# Patient Record
Sex: MALE | Race: WHITE | URBAN - METROPOLITAN AREA
[De-identification: names, ages, dates, MRNs, and addresses within clinical notes are randomized per-mention and may not be internally consistent; named-entity substitution may affect disease eponyms.]

---

## 2018-08-09 ENCOUNTER — OFFICE VISIT (OUTPATIENT)
Dept: URBAN - METROPOLITAN AREA CLINIC 76 | Facility: CLINIC | Age: 58
End: 2018-08-09
Payer: COMMERCIAL

## 2018-08-09 PROCEDURE — 92014 COMPRE OPH EXAM EST PT 1/>: CPT | Performed by: OPTOMETRIST

## 2018-08-09 ASSESSMENT — INTRAOCULAR PRESSURE
OD: 21
OS: 20

## 2018-08-09 NOTE — IMPRESSION/PLAN
Impression: Diagnosis: Type 2 diabetes mellitus without complications. Code: E11.9. Plan: DM: No diabetic retinopathy. Discussed ocular and systemic benefits of blood sugar control. Patient was instructed to monitor vision for sudden changes and to call if visual changes noted.

## 2018-08-09 NOTE — IMPRESSION/PLAN
Impression: Combined forms of age-related cataract, bilateral: H25.813. Plan: Cataracts affecting vision some, but no surgery is currently recommended. The patient will monitor vision changes and contact us with any decrease in vision. Continue to monitor.

## 2018-08-09 NOTE — IMPRESSION/PLAN
Impression: Diagnosis: Open angle with borderline findings, low risk, bilateral. Code: H40.013. Tmax 26. Stays low 20s. thick pachs. Last OCT/VF 2012. Plan: Discussed. Advise updated glc testing: VF/OCT with review.

## 2018-09-12 ENCOUNTER — OFFICE VISIT (OUTPATIENT)
Dept: URBAN - METROPOLITAN AREA CLINIC 76 | Facility: CLINIC | Age: 58
End: 2018-09-12
Payer: COMMERCIAL

## 2018-09-12 PROCEDURE — 92012 INTRM OPH EXAM EST PATIENT: CPT | Performed by: OPTOMETRIST

## 2018-09-12 PROCEDURE — 92083 EXTENDED VISUAL FIELD XM: CPT | Performed by: OPTOMETRIST

## 2018-09-12 PROCEDURE — 92133 CPTRZD OPH DX IMG PST SGM ON: CPT | Performed by: OPTOMETRIST

## 2018-09-12 RX ORDER — LATANOPROST 50 UG/ML
0.005 % SOLUTION OPHTHALMIC
Qty: 2.5 | Refills: 5 | Status: INACTIVE
Start: 2018-09-12 | End: 2019-05-20

## 2018-09-12 ASSESSMENT — INTRAOCULAR PRESSURE
OD: 19
OS: 18

## 2018-09-12 NOTE — IMPRESSION/PLAN
Impression: Primary open-angle glaucoma, bilateral, mild stage: H40.1131. VF 9/12/18 OU: nasal defects, worse compared to 2012. OCT 9/12/18: WNL but worse OU. Thicks pachs. Plan: Discussed condition and risk of permanent sight loss without treatment. Recommend start Latanoprost QHS OU.

## 2018-10-18 ENCOUNTER — OFFICE VISIT (OUTPATIENT)
Dept: URBAN - METROPOLITAN AREA CLINIC 76 | Facility: CLINIC | Age: 58
End: 2018-10-18
Payer: COMMERCIAL

## 2018-10-18 PROCEDURE — 99213 OFFICE O/P EST LOW 20 MIN: CPT | Performed by: OPTOMETRIST

## 2018-10-18 ASSESSMENT — INTRAOCULAR PRESSURE
OS: 12
OD: 13

## 2018-10-18 NOTE — IMPRESSION/PLAN
Impression: Primary open-angle glaucoma, bilateral, mild stage: H40.1131. VF 9/12/18 OU: nasal defects, worse compared to 2012. OCT 9/12/18: WNL but worse OU. Thicks pachs. IOP improved on latan. Mild itch for few seconds until lightly rubs. Plan: Discussed condition and risk of permanent sight loss without treatment. Continue Latanoprost QHS OU. Call if itching worsens.

## 2019-05-29 ENCOUNTER — OFFICE VISIT (OUTPATIENT)
Dept: URBAN - METROPOLITAN AREA CLINIC 76 | Facility: CLINIC | Age: 59
End: 2019-05-29
Payer: MEDICARE

## 2019-05-29 PROCEDURE — 92012 INTRM OPH EXAM EST PATIENT: CPT | Performed by: OPTOMETRIST

## 2019-05-29 RX ORDER — TIMOLOL MALEATE 5 MG/ML
0.5 % SOLUTION/ DROPS OPHTHALMIC
Qty: 5 | Refills: 1 | Status: INACTIVE
Start: 2019-05-29 | End: 2019-08-06

## 2019-05-29 ASSESSMENT — INTRAOCULAR PRESSURE
OD: 20
OS: 18

## 2019-05-29 NOTE — IMPRESSION/PLAN
Impression: Primary open-angle glaucoma, bilateral, mild stage: H40.1131. VF 9/12/18 OU: nasal defects, worse compared to 2012. OCT 9/12/18: WNL but worse OU. Thicks pachs. IOP UNCONTROLLED now on latan. Ineffective or IOP got worse? Pt didn't return as advised due to health issues. Plan: Discussed condition and risk of permanent sight loss without treatment. STOP Latanoprost QHS OU. Begin timolol BID OU. Pt says no heart or lung problems.

## 2019-07-11 ENCOUNTER — OFFICE VISIT (OUTPATIENT)
Dept: URBAN - METROPOLITAN AREA CLINIC 76 | Facility: CLINIC | Age: 59
End: 2019-07-11
Payer: MEDICARE

## 2019-07-11 PROCEDURE — 92012 INTRM OPH EXAM EST PATIENT: CPT | Performed by: OPTOMETRIST

## 2019-07-11 ASSESSMENT — INTRAOCULAR PRESSURE
OD: 18
OS: 12
OS: 14
OD: 17

## 2019-07-11 NOTE — IMPRESSION/PLAN
Impression: Primary open-angle glaucoma, bilateral, mild stage: H40.1131. VF 9/12/18 OU: nasal defects, worse compared to 2012. OCT 9/12/18: WNL but worse OU. Thicks pachs. IOP UNCONTROLLED OD timolol OU. IOP improved OS, still too high for ON appearance OD Plan: Discussed condition and risk of permanent sight loss without treatment. Stop Timolol BID OU, Begin Alphagan-P BID OU. Due to complexity of getting pressure to drop, refer to Dr. Bibi Small to review other non-medicated options.

## 2019-08-06 ENCOUNTER — OFFICE VISIT (OUTPATIENT)
Dept: URBAN - METROPOLITAN AREA CLINIC 76 | Facility: CLINIC | Age: 59
End: 2019-08-06
Payer: MEDICARE

## 2019-08-06 DIAGNOSIS — H40.1131 PRIMARY OPEN-ANGLE GLAUCOMA, BILATERAL, MILD STAGE: Primary | ICD-10-CM

## 2019-08-06 PROCEDURE — 92004 COMPRE OPH EXAM NEW PT 1/>: CPT | Performed by: OPHTHALMOLOGY

## 2019-08-06 PROCEDURE — 92014 COMPRE OPH EXAM EST PT 1/>: CPT | Performed by: OPHTHALMOLOGY

## 2019-08-06 RX ORDER — BRIMONIDINE TARTRATE 1 MG/ML
0.1 % SOLUTION/ DROPS OPHTHALMIC
Qty: 15 | Refills: 3 | Status: INACTIVE
Start: 2019-08-06 | End: 2019-08-07

## 2019-08-06 ASSESSMENT — INTRAOCULAR PRESSURE
OD: 14
OS: 15

## 2019-08-06 NOTE — IMPRESSION/PLAN
Impression: Primary open-angle glaucoma, bilateral, mild stage: H40.1131. OU. vs glaucoma suspect. IOP OU ok today
testing reviewed. VF showing fluctuations. Plan: Continue to monitor. D/C Alphagan will switch to Brimonidine BID OU.

## 2019-08-07 RX ORDER — BRIMONIDINE TARTRATE 2 MG/ML
0.2 % SOLUTION/ DROPS OPHTHALMIC
Qty: 15 | Refills: 3 | Status: INACTIVE
Start: 2019-08-07 | End: 2020-02-21

## 2019-08-28 ENCOUNTER — OFFICE VISIT (OUTPATIENT)
Dept: URBAN - METROPOLITAN AREA CLINIC 76 | Facility: CLINIC | Age: 59
End: 2019-08-28
Payer: MEDICARE

## 2019-08-28 PROCEDURE — 92014 COMPRE OPH EXAM EST PT 1/>: CPT | Performed by: OPTOMETRIST

## 2019-08-28 ASSESSMENT — INTRAOCULAR PRESSURE
OD: 15
OS: 16

## 2019-08-28 NOTE — IMPRESSION/PLAN
Impression: Type 2 diabetes mellitus w/o complication: L29.0. Plan: DM: No diabetic retinopathy. Discussed ocular and systemic benefits of blood sugar control. Patient was instructed to monitor vision for changes and to call if noted.

## 2019-08-28 NOTE — IMPRESSION/PLAN
Impression: Primary open-angle glaucoma, bilateral, mild stage: H40.1131. OU. vs glaucoma suspect. IOP OU ok today
testing reviewed. VF showing fluctuations. Plan: Continue to monitor. Continue Brimonidine BID OU.

## 2019-12-04 ENCOUNTER — OFFICE VISIT (OUTPATIENT)
Dept: URBAN - METROPOLITAN AREA CLINIC 76 | Facility: CLINIC | Age: 59
End: 2019-12-04
Payer: MEDICARE

## 2019-12-04 PROCEDURE — 99213 OFFICE O/P EST LOW 20 MIN: CPT | Performed by: OPTOMETRIST

## 2019-12-04 ASSESSMENT — INTRAOCULAR PRESSURE
OS: 22
OD: 18

## 2019-12-04 NOTE — IMPRESSION/PLAN
Impression: Primary open-angle glaucoma, bilateral, mild stage: H40.1131. VF 9/12/18 OU: nasal defects, worse compared to 2012. OCT 9/12/18: WNL but worse OU. Thicks pachs. IOP higher OU today. Plan: Discussed condition. Advised compliance. Continue Brimonidine BID OU. Consider additional treatment. Needs updated tests.

## 2020-01-01 ENCOUNTER — OFFICE VISIT (OUTPATIENT)
Dept: URBAN - METROPOLITAN AREA CLINIC 76 | Facility: CLINIC | Age: 60
End: 2020-01-01
Payer: MEDICARE

## 2020-01-01 PROCEDURE — 99213 OFFICE O/P EST LOW 20 MIN: CPT | Performed by: OPTOMETRIST

## 2020-01-01 ASSESSMENT — INTRAOCULAR PRESSURE
OD: 12
OD: 18
OS: 12
OS: 18

## 2020-01-28 ENCOUNTER — OFFICE VISIT (OUTPATIENT)
Dept: URBAN - METROPOLITAN AREA CLINIC 76 | Facility: CLINIC | Age: 60
End: 2020-01-28
Payer: MEDICARE

## 2020-01-28 DIAGNOSIS — H40.013 OPEN ANGLE WITH BORDERLINE FINDINGS, LOW RISK, BILATERAL: ICD-10-CM

## 2020-01-28 PROCEDURE — 92012 INTRM OPH EXAM EST PATIENT: CPT | Performed by: OPTOMETRIST

## 2020-01-28 PROCEDURE — 92083 EXTENDED VISUAL FIELD XM: CPT | Performed by: OPTOMETRIST

## 2020-01-28 PROCEDURE — 92133 CPTRZD OPH DX IMG PST SGM ON: CPT | Performed by: OPTOMETRIST

## 2020-01-28 RX ORDER — DORZOLAMIDE HCL 20 MG/ML
2 % SOLUTION/ DROPS OPHTHALMIC
Qty: 5 | Refills: 3 | Status: INACTIVE
Start: 2020-01-28 | End: 2020-01-01

## 2020-01-28 ASSESSMENT — INTRAOCULAR PRESSURE
OD: 20
OS: 20

## 2020-01-28 NOTE — IMPRESSION/PLAN
Impression: Primary open-angle glaucoma, bilateral, mild stage: H40.1131. VF 01/28/2020 OU: superior arc worse, general reduction OS. OCT 01/28/2020: worse OU. Thicks pachs. IOP higher OU today. Plan: Discussed condition. Advised compliance. Continue Brimonidine BID OU. recommend starting additional treatment.  Will start Dorzolamide BID OU. space drops out at least 5 mins apart

## 2020-01-28 NOTE — IMPRESSION/PLAN
Impression: Type 2 diabetes mellitus w/o complication: Q06.4. Plan: DM: No diabetic retinopathy. Discussed ocular and systemic benefits of blood sugar control. Patient was instructed to monitor vision for changes and to call if noted.

## 2020-02-25 ENCOUNTER — OFFICE VISIT (OUTPATIENT)
Dept: URBAN - METROPOLITAN AREA CLINIC 76 | Facility: CLINIC | Age: 60
End: 2020-02-25
Payer: MEDICARE

## 2020-02-25 PROCEDURE — 99213 OFFICE O/P EST LOW 20 MIN: CPT | Performed by: OPTOMETRIST

## 2020-02-25 ASSESSMENT — INTRAOCULAR PRESSURE
OD: 18
OS: 19

## 2020-02-25 NOTE — IMPRESSION/PLAN
Impression: Primary open-angle glaucoma, bilateral, mild stage: H40.1131. VF 01/28/2020 OU: superior arc worse, general reduction OS. OCT 01/28/2020: worse OU. Thick pachs. IOP not controlled OU. Target: mid-upper teens. latanoprost, timolol, dorzolamide ineffective. Brimonidine seemed effective for approx 5 mo (July to Dec). MMT. Plan: Discussed condition. Advised compliance. Continue Brimonidine BID OU. Stop Dorzolamide BID OU. Refer to Dr. Brian Moyer to consider addtl/alternative treatment options.

## 2020-04-16 ENCOUNTER — OFFICE VISIT (OUTPATIENT)
Dept: URBAN - METROPOLITAN AREA CLINIC 76 | Facility: CLINIC | Age: 60
End: 2020-04-16
Payer: MEDICARE

## 2020-04-16 DIAGNOSIS — E11.9 TYPE 2 DIABETES MELLITUS W/O COMPLICATION: ICD-10-CM

## 2020-04-16 DIAGNOSIS — H25.13 AGE-RELATED NUCLEAR CATARACT, BILATERAL: ICD-10-CM

## 2020-04-16 PROCEDURE — 92020 GONIOSCOPY: CPT | Performed by: OPHTHALMOLOGY

## 2020-04-16 PROCEDURE — 92014 COMPRE OPH EXAM EST PT 1/>: CPT | Performed by: OPHTHALMOLOGY

## 2020-04-16 ASSESSMENT — INTRAOCULAR PRESSURE
OD: 19
OS: 18

## 2020-04-16 NOTE — IMPRESSION/PLAN
Impression: Primary open-angle glaucoma, bilateral, mild stage: H40.1131. IOP OU good today. VF and OCT reviewed Target: Mid -High teens Plan: Discussed condition. Continue to monitor. Continue Brimonidine BID OU. No additional treatment recommend at this time. Will transfer care back to Dr Shelvy Cooks

## 2020-04-16 NOTE — IMPRESSION/PLAN
Impression: Type 2 diabetes mellitus w/o complication: E31.7. Plan: DM: No diabetic retinopathy. Discussed ocular and systemic benefits of blood sugar control. Patient was instructed to monitor vision for changes and to call if noted.

## 2020-07-22 NOTE — IMPRESSION/PLAN
Impression: Primary open-angle glaucoma, bilateral, mild stage: H40.1131. IOP OU good today. VF 01/28/2020 OU: superior arc worse, general reduction OS. OCT 01/28/2020: worse OU. Thick pachs. Target: mid-upper teens. latanoprost, timolol, dorzolamide ineffective. Brimonidine seemed effective for approx 5 mo (July to Dec). MMT. Plan: Discussed condition. Advised compliance. Continue Brimonidine BID OU. 
No additional treatment per Dr Kurt Rivera

## 2020-07-22 NOTE — IMPRESSION/PLAN
Impression: Type 2 diabetes mellitus w/o complication: C39.8. Plan: DM: No diabetic retinopathy. Discussed ocular and systemic benefits of blood sugar control. Patient was instructed to monitor vision for changes and to call if noted.

## 2020-10-28 NOTE — IMPRESSION/PLAN
Impression: Presbyopia: H52.4. Bilateral. Last mrx was 2017. Plan: Ok to schedule refraction if/when pt would like. Advised cannot be done day of annual DE/VF/OCT due OAG and DM.   Needs to be separate exam.

## 2020-10-28 NOTE — IMPRESSION/PLAN
Impression: Primary open-angle glaucoma, bilateral, mild stage: H40.1131. IOP higher OU today. VF 01/28/2020 OU: superior arc worse, general reduction OS. OCT 01/28/2020: worse OU. Thick pachs. Target: mid-upper teens. Latanoprost, Timolol, Dorzolamide ineffective. Brimonidine seemed effective for approx 5 mo (July to Dec). MMT. Plan: Discussed condition. Advised compliance. Continue Brimonidine BID OU. No additional treatment per Dr Eduardo Llanos. Repeat VF/OCT 1/2021.

## 2020-10-28 NOTE — IMPRESSION/PLAN
Impression: Age-related nuclear cataract, bilateral: H25.13. OU. Plan: Discussed condition. Continue to monitor.

## 2021-01-01 ENCOUNTER — POST-OPERATIVE VISIT (OUTPATIENT)
Dept: URBAN - METROPOLITAN AREA CLINIC 76 | Facility: CLINIC | Age: 61
End: 2021-01-01
Payer: MEDICARE

## 2021-01-01 ENCOUNTER — TESTING ONLY (OUTPATIENT)
Dept: URBAN - METROPOLITAN AREA CLINIC 76 | Facility: CLINIC | Age: 61
End: 2021-01-01

## 2021-01-01 ENCOUNTER — SURGERY (OUTPATIENT)
Dept: URBAN - METROPOLITAN AREA SURGERY 20 | Facility: SURGERY | Age: 61
End: 2021-01-01
Payer: MEDICARE

## 2021-01-01 ENCOUNTER — TESTING ONLY (OUTPATIENT)
Dept: URBAN - METROPOLITAN AREA CLINIC 45 | Facility: CLINIC | Age: 61
End: 2021-01-01
Payer: MEDICARE

## 2021-01-01 ENCOUNTER — OFFICE VISIT (OUTPATIENT)
Dept: URBAN - METROPOLITAN AREA CLINIC 76 | Facility: CLINIC | Age: 61
End: 2021-01-01
Payer: COMMERCIAL

## 2021-01-01 DIAGNOSIS — H52.4 PRESBYOPIA: Primary | ICD-10-CM

## 2021-01-01 DIAGNOSIS — Z96.1 PRESENCE OF INTRAOCULAR LENS: Primary | ICD-10-CM

## 2021-01-01 DIAGNOSIS — H40.1132 PRIMARY OPEN-ANGLE GLAUCOMA, BILATERAL, MODERATE STAGE: Primary | ICD-10-CM

## 2021-01-01 DIAGNOSIS — H25.813 COMBINED FORMS OF AGE-RELATED CATARACT, BILATERAL: ICD-10-CM

## 2021-01-01 DIAGNOSIS — H52.13 MYOPIA, BILATERAL: Primary | ICD-10-CM

## 2021-01-01 DIAGNOSIS — Z48.810 ENCOUNTER FOR SURGICAL AFTERCARE FOLLOWING SURGERY ON A SENSE ORGAN: Primary | ICD-10-CM

## 2021-01-01 PROCEDURE — 99214 OFFICE O/P EST MOD 30 MIN: CPT | Performed by: OPHTHALMOLOGY

## 2021-01-01 PROCEDURE — 0191T INSERTION OF ANTERIOR SEGMENT AQUEOUS DRAINAGE DEVICE, W/OUT EXTRAOCULAR RESERVO: CPT | Performed by: OPHTHALMOLOGY

## 2021-01-01 PROCEDURE — 92133 CPTRZD OPH DX IMG PST SGM ON: CPT | Performed by: OPTOMETRIST

## 2021-01-01 PROCEDURE — 0376T INSERT ANT SEG AQUEOUS DEVICE; EA ADDTL: CPT | Performed by: OPHTHALMOLOGY

## 2021-01-01 PROCEDURE — 92133 CPTRZD OPH DX IMG PST SGM ON: CPT | Performed by: OPHTHALMOLOGY

## 2021-01-01 PROCEDURE — 99024 POSTOP FOLLOW-UP VISIT: CPT | Performed by: OPTOMETRIST

## 2021-01-01 PROCEDURE — 66984 XCAPSL CTRC RMVL W/O ECP: CPT | Performed by: OPHTHALMOLOGY

## 2021-01-01 PROCEDURE — 92020 GONIOSCOPY: CPT | Performed by: OPHTHALMOLOGY

## 2021-01-01 PROCEDURE — 76514 ECHO EXAM OF EYE THICKNESS: CPT | Performed by: OPHTHALMOLOGY

## 2021-01-01 PROCEDURE — 92014 COMPRE OPH EXAM EST PT 1/>: CPT | Performed by: OPTOMETRIST

## 2021-01-01 PROCEDURE — 92136 OPHTHALMIC BIOMETRY: CPT | Performed by: OPHTHALMOLOGY

## 2021-01-01 PROCEDURE — 92083 EXTENDED VISUAL FIELD XM: CPT | Performed by: OPTOMETRIST

## 2021-01-01 PROCEDURE — V2799 MISC VISION ITEM OR SERVICE: HCPCS | Performed by: OPTOMETRIST

## 2021-01-01 ASSESSMENT — PACHYMETRY
OS: 24.65
OS: 3.52
OD: 3.47
OD: 24.55

## 2021-01-01 ASSESSMENT — KERATOMETRY
OS: 43.63
OD: 43.75
OD: 43.13
OS: 43.63

## 2021-01-01 ASSESSMENT — INTRAOCULAR PRESSURE
OS: 18
OD: 10
OD: 17
OS: 12
OD: 11
OS: 17
OS: 11
OD: 11
OD: 17
OD: 21
OD: 17
OS: 18
OD: 18
OS: 11

## 2021-01-01 ASSESSMENT — VISUAL ACUITY
OS: 20/30
OS: 20/20
OD: 20/50
OS: 20/20
OD: 20/20
OD: 20/40
OD: 20/20
OS: 20/40
OS: 20/20
OD: 20/30

## 2021-01-06 NOTE — IMPRESSION/PLAN
Impression: Type 2 diabetes mellitus w/o complication: Z03.4. Bilateral. Plan: DM: No diabetic retinopathy. Discussed ocular and systemic benefits of blood sugar control. Patient was instructed to monitor vision for changes and to call if noted.

## 2021-01-06 NOTE — IMPRESSION/PLAN
Impression: Primary open-angle glaucoma, bilateral, mild stage: H40.1131. IOP higher OU today. VF 01/28/2020 OU: superior arc worse, general reduction OS. OCT 01/28/2020: worse OU. Thick pachs. Target: mid-upper teens. Latanoprost, Timolol, Dorzolamide ineffective. Brimonidine seemed effective for approx 5 mo (July to Dec). MMT. Plan: Discussed condition. Advised compliance. Continue Brimonidine BID OU. No additional treatment per Dr Clemente Hopkins. Repeat VF/OCT 1/2021.

## 2021-02-10 NOTE — IMPRESSION/PLAN
Impression: Type 2 diabetes mellitus w/o complication: V87.7. Bilateral. Plan: DM: No diabetic retinopathy. Discussed ocular and systemic benefits of blood sugar control. Patient was instructed to monitor vision for changes and to call if noted.

## 2021-02-10 NOTE — IMPRESSION/PLAN
Impression: Nuclear sclerosis cataract, bilateral: H25.13. Visually significant. Plan: Cataracts affecting vision some, but no surgery is currently recommended. The patient will monitor vision changes and contact us with any decrease in vision. Continue to monitor.

## 2021-02-10 NOTE — IMPRESSION/PLAN
Impression: Primary open-angle glaucoma, bilateral, mild stage: H40.1131. IOP higher OU today. VF 2/10/21 Worse nasal, general reduction OU. OCT 2/10/21: mildly worse OU. Thick pachs. Target: mid-upper teens. Latanoprost, Timolol, Dorzolamide ineffective. Brimonidine seemed effective for approx 5 mo (July to Dec). MMT. Plan: Discussed condition. Advised compliance. Continue Brimonidine BID OU. Refer to glaucoma specialist due to progression shown with testing today, risk of vision loss expected without control. Consult with glc specialist in the [place] next avail. Discussed possible benefits of cataract Sx/Istent. Due for VF/OCT 2/2022.

## 2021-03-02 NOTE — IMPRESSION/PLAN
Impression: Presbyopia: H52.4. Bilateral. OS varied from +1.50 to -2.50 on [facility]. pt wears +3.00 OTCR w/ magnifier. Plan: A glasses prescription has been discussed and generated, doctor remake. Patient to call with any concerns.

## 2021-03-18 NOTE — IMPRESSION/PLAN
Impression: Combined forms of age-related cataract, bilateral: H25.813. Plan: OK for ce/iol OS then OD with iStent OU in [place] 27, RL2. Distance target. Discussed lens options, Toric/Trifocal. Discussed ORA. Discussed intracameral Dexycu/Moxifloxacin. Pt is a candidate for premium lens. Discussed need for glasses for near vision with standard IOL and possibly Trifocal as well. Discussed diagnosis of cataracts. Cataracts are limiting vision. Discussed risks, benefits and alternatives to surgery including but not limited to: bleeding, infection, risk of vision loss, loss of the eye, need for other surgery. Patient voiced understanding and wishes to proceed.

## 2021-03-18 NOTE — IMPRESSION/PLAN
Impression: Primary open-angle glaucoma, bilateral, moderate stage: M79.8230. /590, 3/21 OCT 79/91 8/9, 2/21 HVF OD S/INS OS INS Plan: Discussed diagnosis with patient. RNFL OCT reviewed with patient. Discussed treatment options. Recommend patient continue Brimonidine BID OU. Recommend iStent OU. Discussed glaucoma and cataracts. Discussed risks, benefits and alternatives to MIGS procedure. Pt voiced understanding and desires to proceed.

## 2021-04-15 NOTE — IMPRESSION/PLAN
Impression: S/P Cataract Extraction by phacoemulsification with IOL placement; Shunt:  iStent Inject; DEXYCU OS - 1 Day. Encounter for surgical aftercare following surgery on a sense organ  Z48.810. Excellent post op course   Post operative instructions reviewed - Plan: Okay to stay off glc drops for now, advised pt drops may need to be restarted in the future. No PO drops needed.

## 2021-04-21 NOTE — IMPRESSION/PLAN
Impression: S/P Cataract Extraction by phacoemulsification with IOL placement; Shunt:  iStent Inject; DEXYCU OS - 7 Days. Encounter for surgical aftercare following surgery on a sense organ  Z48.810. Excellent post op course   Post operative instructions reviewed - Plan: Discussed. Continue Alphagan BID OU, Okay to proceed with second eye as scheduled.

## 2021-04-29 NOTE — IMPRESSION/PLAN
Impression: S/P Cataract Extraction by phacoemulsification with IOL placement; Shunt:  iStent Inject; DEXYCU OD - 1 Day. Presence of intraocular lens  Z96.1. Excellent post op course   Post operative instructions reviewed - Plan: Continue Brimonidine BID OU. Pt to call with concerns.

## 2021-05-05 NOTE — IMPRESSION/PLAN
Impression: S/P CE/Standard IOL SA60WF +18.0 w/ Istent & Dexycu OD - 7 Days. Presence of intraocular lens  Z96.1. Post operative instructions reviewed - IOP good OU today. Plan: Discussed. Continue Brimonidine BID OU. Pt to call with concerns.

## 2021-05-25 NOTE — IMPRESSION/PLAN
Impression: S/P CE/Standard IOL SA60WF +18.0 w/ Istent & Dexycu OD - 27 Days. Presence of intraocular lens  Z96.1. Excellent post op course   Post operative instructions reviewed - Plan: Discussed. Continue Brimonidine BID OU. Pt defers final Final PO Mrx today, happy with OCTR  Pt to call with concerns.